# Patient Record
Sex: MALE | Race: WHITE | NOT HISPANIC OR LATINO | Employment: FULL TIME | URBAN - METROPOLITAN AREA
[De-identification: names, ages, dates, MRNs, and addresses within clinical notes are randomized per-mention and may not be internally consistent; named-entity substitution may affect disease eponyms.]

---

## 2024-06-02 ENCOUNTER — HOSPITAL ENCOUNTER (EMERGENCY)
Facility: HOSPITAL | Age: 19
Discharge: HOME | End: 2024-06-02
Attending: EMERGENCY MEDICINE | Admitting: EMERGENCY MEDICINE
Payer: COMMERCIAL

## 2024-06-02 VITALS
TEMPERATURE: 97.6 F | OXYGEN SATURATION: 99 % | RESPIRATION RATE: 18 BRPM | DIASTOLIC BLOOD PRESSURE: 83 MMHG | HEIGHT: 73 IN | SYSTOLIC BLOOD PRESSURE: 133 MMHG | HEART RATE: 69 BPM | BODY MASS INDEX: 18.55 KG/M2 | WEIGHT: 140 LBS

## 2024-06-02 DIAGNOSIS — K29.70 GASTRITIS: Primary | ICD-10-CM

## 2024-06-02 LAB
ALBUMIN SERPL BCP-MCNC: 5.2 G/DL (ref 3.5–5)
ALP SERPL-CCNC: 68 U/L (ref 34–104)
ALT SERPL W P-5'-P-CCNC: 22 U/L (ref 7–52)
ANION GAP SERPL CALCULATED.3IONS-SCNC: 7 MMOL/L (ref 4–13)
AST SERPL W P-5'-P-CCNC: 20 U/L (ref 13–39)
BASOPHILS # BLD AUTO: 0.02 THOUSANDS/ÂΜL (ref 0–0.1)
BASOPHILS NFR BLD AUTO: 0 % (ref 0–1)
BILIRUB SERPL-MCNC: 0.98 MG/DL (ref 0.2–1)
BUN SERPL-MCNC: 2 MG/DL (ref 5–25)
CALCIUM SERPL-MCNC: 9.7 MG/DL (ref 8.4–10.2)
CHLORIDE SERPL-SCNC: 102 MMOL/L (ref 96–108)
CO2 SERPL-SCNC: 28 MMOL/L (ref 21–32)
CREAT SERPL-MCNC: 0.69 MG/DL (ref 0.6–1.3)
EOSINOPHIL # BLD AUTO: 0.02 THOUSAND/ÂΜL (ref 0–0.61)
EOSINOPHIL NFR BLD AUTO: 0 % (ref 0–6)
ERYTHROCYTE [DISTWIDTH] IN BLOOD BY AUTOMATED COUNT: 12.8 % (ref 11.6–15.1)
GFR SERPL CREATININE-BSD FRML MDRD: 136 ML/MIN/1.73SQ M
GLUCOSE SERPL-MCNC: 88 MG/DL (ref 65–140)
HCT VFR BLD AUTO: 44.9 % (ref 36.5–49.3)
HGB BLD-MCNC: 14.8 G/DL (ref 12–17)
IMM GRANULOCYTES # BLD AUTO: 0.01 THOUSAND/UL (ref 0–0.2)
IMM GRANULOCYTES NFR BLD AUTO: 0 % (ref 0–2)
LIPASE SERPL-CCNC: <6 U/L (ref 11–82)
LYMPHOCYTES # BLD AUTO: 0.79 THOUSANDS/ÂΜL (ref 0.6–4.47)
LYMPHOCYTES NFR BLD AUTO: 14 % (ref 14–44)
MCH RBC QN AUTO: 30.5 PG (ref 26.8–34.3)
MCHC RBC AUTO-ENTMCNC: 33 G/DL (ref 31.4–37.4)
MCV RBC AUTO: 93 FL (ref 82–98)
MONOCYTES # BLD AUTO: 0.5 THOUSAND/ÂΜL (ref 0.17–1.22)
MONOCYTES NFR BLD AUTO: 9 % (ref 4–12)
NEUTROPHILS # BLD AUTO: 4.44 THOUSANDS/ÂΜL (ref 1.85–7.62)
NEUTS SEG NFR BLD AUTO: 77 % (ref 43–75)
NRBC BLD AUTO-RTO: 0 /100 WBCS
PLATELET # BLD AUTO: 226 THOUSANDS/UL (ref 149–390)
PMV BLD AUTO: 11.5 FL (ref 8.9–12.7)
POTASSIUM SERPL-SCNC: 3.8 MMOL/L (ref 3.5–5.3)
PROT SERPL-MCNC: 8 G/DL (ref 6.4–8.4)
RBC # BLD AUTO: 4.85 MILLION/UL (ref 3.88–5.62)
SODIUM SERPL-SCNC: 137 MMOL/L (ref 135–147)
WBC # BLD AUTO: 5.78 THOUSAND/UL (ref 4.31–10.16)

## 2024-06-02 PROCEDURE — 99284 EMERGENCY DEPT VISIT MOD MDM: CPT | Performed by: EMERGENCY MEDICINE

## 2024-06-02 PROCEDURE — 99284 EMERGENCY DEPT VISIT MOD MDM: CPT

## 2024-06-02 PROCEDURE — 85025 COMPLETE CBC W/AUTO DIFF WBC: CPT | Performed by: EMERGENCY MEDICINE

## 2024-06-02 PROCEDURE — 83690 ASSAY OF LIPASE: CPT | Performed by: EMERGENCY MEDICINE

## 2024-06-02 PROCEDURE — 80053 COMPREHEN METABOLIC PANEL: CPT | Performed by: EMERGENCY MEDICINE

## 2024-06-02 PROCEDURE — 36415 COLL VENOUS BLD VENIPUNCTURE: CPT | Performed by: EMERGENCY MEDICINE

## 2024-06-02 RX ORDER — OMEPRAZOLE 20 MG/1
20 CAPSULE, DELAYED RELEASE ORAL DAILY
Qty: 30 CAPSULE | Refills: 0 | Status: SHIPPED | OUTPATIENT
Start: 2024-06-02

## 2024-06-02 RX ORDER — MAGNESIUM HYDROXIDE/ALUMINUM HYDROXICE/SIMETHICONE 120; 1200; 1200 MG/30ML; MG/30ML; MG/30ML
30 SUSPENSION ORAL ONCE
Status: COMPLETED | OUTPATIENT
Start: 2024-06-02 | End: 2024-06-02

## 2024-06-02 RX ORDER — ONDANSETRON 4 MG/1
4 TABLET, FILM COATED ORAL EVERY 6 HOURS
Qty: 12 TABLET | Refills: 0 | Status: SHIPPED | OUTPATIENT
Start: 2024-06-02

## 2024-06-02 RX ORDER — ONDANSETRON 4 MG/1
4 TABLET, ORALLY DISINTEGRATING ORAL ONCE
Status: COMPLETED | OUTPATIENT
Start: 2024-06-02 | End: 2024-06-02

## 2024-06-02 RX ADMIN — ALUMINUM HYDROXIDE, MAGNESIUM HYDROXIDE, AND DIMETHICONE 30 ML: 200; 20; 200 SUSPENSION ORAL at 15:26

## 2024-06-02 RX ADMIN — ONDANSETRON 4 MG: 4 TABLET, ORALLY DISINTEGRATING ORAL at 15:26

## 2024-06-02 NOTE — DISCHARGE INSTRUCTIONS
A  personal message from Dr. Koko Crystal,  Thank you so much for allowing me to care for you today.    I pride myself in the care and attention I give all my patients.  I hope you were a witness to this tonight.   If for any reason your condition does not improve or worsens, or you have a question that was not answered during your visit you can feel free to text me on my personal phone #  # [phone].   I will answer to your message and continue your care past your emergency room visit.     Please understand that although you are being discharged because your condition has been deemed stable and able to be managed on an outpatient setting. However your condition may worsen as part of the natural progression of the illness/condition, if this occurs please come back to the emergency department for a repeat evaluation.

## 2024-06-02 NOTE — ED PROVIDER NOTES
"History  Chief Complaint   Patient presents with    Black or Bloody Stool     Pt c/o \"bloody vomit and black stools x6 months that has gotten worse. Kesha been following up with [facility], have gotten multiple tests done, but it keeps happening.\"  Had upper endoscopy on 5/2     Pete Sanderson is a 19 y.o.  year old male  History reviewed. No pertinent past medical history.  Social History    Tobacco Use      Smoking status: Never      Smokeless tobacco: Never    Alcohol use: Never    Drug use: Yes      Types: Marijuana    Patient presents with:  Black or Bloody Stool: Pt c/o \"bloody vomit and black stools x6 months that has gotten worse. Kesha been following up with [facility], have gotten multiple tests done, but it keeps happening.\"  Had upper endoscopy on 5/2  This patient has a chronic history of nausea and vomiting.  He has noticed his stool is now dark and vomit looks like coffee grounds or digested blood.  He has had GI workups in the past including endoscopy earlier this month.  Patient supposed to be on daily antiacids which she takes.    History obtained directly from the PATIENT              History provided by:  Patient   used: No        None       History reviewed. No pertinent past medical history.    History reviewed. No pertinent surgical history.    History reviewed. No pertinent family history.  I have reviewed and agree with the history as documented.    E-Cigarette/Vaping     E-Cigarette/Vaping Substances     Social History     Tobacco Use    Smoking status: Never    Smokeless tobacco: Never   Substance Use Topics    Alcohol use: Never    Drug use: Yes     Types: Marijuana       Review of Systems   Constitutional:  Negative for chills and fever.   HENT:  Negative for ear pain and sore throat.    Eyes:  Negative for pain and visual disturbance.   Respiratory:  Negative for cough and shortness of breath.    Cardiovascular:  Negative for chest pain and palpitations.   Gastrointestinal:  Positive " for abdominal pain, nausea and vomiting.   Genitourinary:  Negative for dysuria and hematuria.   Musculoskeletal:  Negative for arthralgias and back pain.   Skin:  Negative for color change and rash.   Neurological:  Negative for seizures and syncope.   All other systems reviewed and are negative.      Physical Exam  Physical Exam  Vitals and nursing note reviewed.   Constitutional:       General: He is not in acute distress.     Appearance: Normal appearance. He is well-developed and normal weight.   HENT:      Head: Normocephalic and atraumatic.      Nose: Nose normal.   Eyes:      Conjunctiva/sclera: Conjunctivae normal.      Pupils: Pupils are equal, round, and reactive to light.   Cardiovascular:      Rate and Rhythm: Normal rate and regular rhythm.      Heart sounds: No murmur heard.  Pulmonary:      Effort: Pulmonary effort is normal. No respiratory distress.      Breath sounds: Normal breath sounds.   Abdominal:      Palpations: Abdomen is soft.      Tenderness: There is no abdominal tenderness.   Musculoskeletal:         General: No swelling.      Cervical back: Neck supple.   Skin:     General: Skin is warm and dry.      Capillary Refill: Capillary refill takes less than 2 seconds.   Neurological:      General: No focal deficit present.      Mental Status: He is alert and oriented to person, place, and time.   Psychiatric:         Mood and Affect: Mood normal.         Thought Content: Thought content normal.         Vital Signs  ED Triage Vitals [06/02/24 1459]   Temperature Pulse Respirations Blood Pressure SpO2   97.6 °F (36.4 °C) 69 18 133/83 99 %      Temp Source Heart Rate Source Patient Position - Orthostatic VS BP Location FiO2 (%)   Temporal Monitor Sitting Left arm --      Pain Score       --           Vitals:    06/02/24 1459   BP: 133/83   Pulse: 69   Patient Position - Orthostatic VS: Sitting         Visual Acuity      ED Medications  Medications   ondansetron (ZOFRAN-ODT) dispersible tablet 4  mg (4 mg Oral Given 6/2/24 1526)   aluminum-magnesium hydroxide-simethicone (MAALOX) oral suspension 30 mL (30 mL Oral Given 6/2/24 1526)       Diagnostic Studies  Results Reviewed       Procedure Component Value Units Date/Time    Comprehensive metabolic panel [[id number]]  (Abnormal) Collected: 06/02/24 1533    Lab Status: Final result Specimen: Blood from Arm, Right Updated: 06/02/24 1609     Sodium 137 mmol/L      Potassium 3.8 mmol/L      Chloride 102 mmol/L      CO2 28 mmol/L      ANION GAP 7 mmol/L      BUN 2 mg/dL      Creatinine 0.69 mg/dL      Glucose 88 mg/dL      Calcium 9.7 mg/dL      AST 20 U/L      ALT 22 U/L      Alkaline Phosphatase 68 U/L      Total Protein 8.0 g/dL      Albumin 5.2 g/dL      Total Bilirubin 0.98 mg/dL      eGFR 136 ml/min/1.73sq m     Narrative:      [employer] guidelines for Chronic Kidney Disease (CKD):     Stage 1 with normal or high GFR (GFR > 90 mL/min/1.73 square meters)    Stage 2 Mild CKD (GFR = 60-89 mL/min/1.73 square meters)    Stage 3A Moderate CKD (GFR = 45-59 mL/min/1.73 square meters)    Stage 3B Moderate CKD (GFR = 30-44 mL/min/1.73 square meters)    Stage 4 Severe CKD (GFR = 15-29 mL/min/1.73 square meters)    Stage 5 End Stage CKD (GFR <15 mL/min/1.73 square meters)  Note: GFR calculation is accurate only with a steady state creatinine    Lipase [[id number]]  (Abnormal) Collected: 06/02/24 1533    Lab Status: Final result Specimen: Blood from Arm, Right Updated: 06/02/24 1609     Lipase <6 u/L     CBC and differential [[id number]]  (Abnormal) Collected: 06/02/24 1533    Lab Status: Final result Specimen: Blood from Arm, Right Updated: 06/02/24 1554     WBC 5.78 Thousand/uL      RBC 4.85 Million/uL      Hemoglobin 14.8 g/dL      Hematocrit 44.9 %      MCV 93 fL      MCH 30.5 pg      MCHC 33.0 g/dL      RDW 12.8 %      MPV 11.5 fL      Platelets 226 Thousands/uL      nRBC 0 /100 WBCs      Segmented % 77 %      Immature Grans % 0 %       Lymphocytes % 14 %      Monocytes % 9 %      Eosinophils Relative 0 %      Basophils Relative 0 %      Absolute Neutrophils 4.44 Thousands/µL      Absolute Immature Grans 0.01 Thousand/uL      Absolute Lymphocytes 0.79 Thousands/µL      Absolute Monocytes 0.50 Thousand/µL      Eosinophils Absolute 0.02 Thousand/µL      Basophils Absolute 0.02 Thousands/µL     Occult Blood, Fecal Immunochemical [[id number]]     Lab Status: No result Specimen: Stool                    No orders to display              Procedures  Procedures         ED Course  ED Course as of 06/02/24 1722   Sun Jun 02, 2024   1611 LIPASE(!): <6   1611 Sodium: 137   1611 Potassium: 3.8   1611 BUN(!): 2   1611 Creatinine: 0.69   1611 WBC: 5.78   1611 Hemoglobin: 14.8   1611 Hematocrit: 44.9   1611 Fecal  occult blood NEGATIVE                                              Medical Decision Making  I have ordered CBC.  This was done to assess for leukocytosis versus leukopenia as possible indicators of infection viral versus bacterial.  Also included hemoglobin and hematocrit for assess for anemia.  And assess platelet numbers.  Thrombocytosis as a marker of inflammation and of course thrombocytopenia assess result of coagulation disorder or DIC.    Metabolic panel to assess for electrolyte deficiency, assess kidney function, and blood sugar levels to assess for hypoglycemia versus hyperglycemia as possible causes of the patient's symptoms.    Liver function test -to assess for liver inflammation with a be viral, obstructive, or reactive to underlying infection/shock liver    Lipase -screen for elevated level as a sign of pancreatitis          Problems Addressed:  Gastritis: chronic illness or injury with exacerbation, progression, or side effects of treatment    Amount and/or Complexity of Data Reviewed  Labs: ordered. Decision-making details documented in ED Course.  Discussion of management or test interpretation with external provider(s): Patient  clinical presentation is benign.    Meaning patient's vital signs are normal and stable ED Triage Vitals [06/02/24 1459]  Temperature: 97.6 °F (36.4 °C)  Pulse: 69  Respirations: 18  Blood Pressure: 133/83  SpO2: 99 %  Temp Source: Temporal  Heart Rate Source: Monitor  Patient Position - Orthostatic VS: Sitting  BP Location: Left arm  FiO2 (%): n/a  Pain Score: n/a.    Patient in no distress.    Chief complaint, vital signs, physical examination does not suggest an acute medical emergency at this time.       Risk  OTC drugs.  Prescription drug management.             Disposition  Final diagnoses:   Gastritis     Time reflects when diagnosis was documented in both MDM as applicable and the Disposition within this note       Time User Action Codes Description Comment    6/2/2024  4:12 PM Koko Crystal Add [K29.70] Gastritis           ED Disposition       ED Disposition   Discharge    Condition   Stable    Date/Time   Sun Jun 2, 2024  4:12 PM    Comment   Pete Sanderson discharge to home/self care.                   Follow-up Information       Follow up With Specialties Details Why Contact Info    Shayla Bueno PA-C Emergency Medicine In 1 week  [address] [place] [address] [place] [address]  [phone]              Discharge Medication List as of 6/2/2024  4:12 PM        START taking these medications    Details   omeprazole (PriLOSEC) 20 mg delayed release capsule Take 1 capsule (20 mg total) by mouth daily, Starting Sun 6/2/2024, Normal      ondansetron (ZOFRAN) 4 mg tablet Take 1 tablet (4 mg total) by mouth every 6 (six) hours, Starting Sun 6/2/2024, Normal             No discharge procedures on file.    PDMP Review       None            ED Provider  Electronically Signed by             Koko Crystal MD  06/02/24 4027